# Patient Record
Sex: FEMALE | Race: WHITE
[De-identification: names, ages, dates, MRNs, and addresses within clinical notes are randomized per-mention and may not be internally consistent; named-entity substitution may affect disease eponyms.]

---

## 2020-12-04 ENCOUNTER — HOSPITAL ENCOUNTER (OUTPATIENT)
Dept: HOSPITAL 88 - CT | Age: 75
End: 2020-12-04
Attending: INTERNAL MEDICINE
Payer: MEDICARE

## 2020-12-04 DIAGNOSIS — I74.10: Primary | ICD-10-CM

## 2020-12-04 LAB
BUN SERPL-MCNC: 16 MG/DL (ref 7–26)
BUN/CREAT SERPL: 21 (ref 6–25)
EGFRCR SERPLBLD CKD-EPI 2021: > 60 ML/MIN (ref 60–?)

## 2020-12-04 PROCEDURE — 84520 ASSAY OF UREA NITROGEN: CPT

## 2020-12-04 PROCEDURE — 74175 CTA ABDOMEN W/CONTRAST: CPT

## 2020-12-04 PROCEDURE — 71275 CT ANGIOGRAPHY CHEST: CPT

## 2020-12-04 PROCEDURE — 36415 COLL VENOUS BLD VENIPUNCTURE: CPT

## 2020-12-04 PROCEDURE — 82565 ASSAY OF CREATININE: CPT

## 2020-12-04 NOTE — DIAGNOSTIC IMAGING REPORT
EXAM: CT CHEST AND ABDOMEN WITH CONTRAST     



CLINICAL INDICATION: Aortic thrombus



TECHNIQUE: CT chest and abdomen was performed, following the administration of

intravenous contrast, as per department protocol. Axial, sagittal, and coronal

reconstructions were obtained. Additional 3-D reconstructions were generated on

the workstation by the technologist.



IV CONTRAST:100 cc of Isovue-300



RADIATION DOSE REDUCTION: This exam was performed according to the departmental

dose-optimization program which includes automated exposure control, adjustment

of the mA and/or kV according to patient size and/or use of iterative

reconstruction technique.



COMPARISON: None



FINDINGS:



VASCULAR COMPONENTS:

Vascular structures including the entire aorta, the cervicocerebral branches,

the celiac superior mesenteric and inferior mesenteric arteries, single

bilateral renal arteries, the common iliac arteries to the extent seen are

without significant abnormality..



Specifically, there is no acute aortic pathology  identified on contrast

enhanced images.



There is atherosclerosis of the aorta. There is minimal wall thickening of the

proximal descending thoracic aorta likely because of the plaque. There is

presence of atherosclerosis and wall adherent plaque or small mural thrombus in

the infrarenal abdominal aorta.



The study was not easy GE aerated. Curved planar reformats and spur not

obtained. The measurements are based on axial images. The ascending aorta

measures 30 mm. The descending thoracic aorta at the level of the pulmonary

artery bifurcation measures 24 mm. Aorta at the diaphragmatic hiatus measures

25 mm. Aorta just below the superior mesenteric artery measures 20 mm.

Infrarenal aorta measures 15.8 mm. Aorta just above the bifurcation measures

12.5 mm.



There is tortuosity and elongation of the carotid branches.



The venous phase was not performed. But grossly there is no significant

abnormality of the veins identified either.



The main pulmonary artery measures 25 mm.



NON-VASCULAR COMPONENTS:

LOWER NECK: 

No pathologic process in imaged portion of lower neck.



PULMONARY PARENCHYMA AND AIRWAYS: 

No pathologic process.



MEDIASTINUM AND ALOK: 

No pathologic process.



HEART AND PERICARDIUM: 

Heart normal size. No coronary artery calcification. No pericardial fluid or

thickening.



PLEURAL SPACES: 

No pleural fluid, pleural thickening or pneumothorax.



CHEST WALL AND AXILLA:

No pathologic process.



MUSCULOSKELETAL:

Degenerative changes



LIVER: 

No pathologic process.



GALLBLADDER: 

A single calcified gallstone without signs of cholecystitis.



BILE DUCTS: 

No pathologic process.



PANCREAS:

No pathologic process.



SPLEEN: 

No pathologic process.



ADRENALS:

No pathologic process.



KIDNEYS AND URETERS: 

Small bilateral cortical renal cysts otherwise unremarkable.



GASTROINTESTINAL TRACT:

Duodenal wall lipomas.



LYMPH NODES:

No lymphadenopathy.



PERITONEUM/MESENTERY:

No free air, significant free fluid, mass or fluid collection.



ADDITIONAL RETROPERITONEAL FINDINGS:

None.



BODY WALLS:

No pathologic process.



ADDITIONAL FINDINGS: 

None.



IMPRESSION: 

No significant acute aortic abnormality on this exam.



Calcified gallstone.



No other significant abnormal findings.



Standardized Report:  [id number]



Signed by: Tray Mera MD on 12/4/2020 1:35 PM